# Patient Record
Sex: FEMALE | Race: WHITE
[De-identification: names, ages, dates, MRNs, and addresses within clinical notes are randomized per-mention and may not be internally consistent; named-entity substitution may affect disease eponyms.]

---

## 2017-06-01 NOTE — EDM.PDOC
ED HPI GENERAL MEDICAL PROBLEM





- General


Chief Complaint: Chest Pain


Stated Complaint: chest pain


Time Seen by Provider: 06/01/17 20:47


Source of Information: Reports: Patient, Family (spouse)


History Limitations: Reports: No Limitations





- History of Present Illness


INITIAL COMMENTS - FREE TEXT/NARRATIVE: 





61-year-old female presents to the ED with diffuse central chest heaviness or 

pressure discomfort that radiates through to her back between the scapulae. She 

first appreciated this about 1730 hours today. This persisted. It does not seem 

to be worsened by anything. She doesn't feel like she has any heartburn 

component although the pain seems to radiate from the stomach up into her 

throat. She feels some discomfort in the right upper chest remedied. Nothing 

the left shoulder neck or arm. No shortness of breath. No cough sputum 

production. No fever or chills. A never smoker. No recent medications and not 

on hormone replacement therapy. No recent travel history. Quite active on a 

daily basis. No family history of heart disease. Rare alcohol user. She did 

feel a slight discomfort summoned to the discomfort she has tonight about 2 

nights ago. She is really not aware of any reflux disease.


Onset: Today


Onset Date: 06/01/17


Onset Time: 17:30


Duration: Hour(s):, Constant


Location: Reports: Chest (Radiates through to her in scapular area. Sore of 

some discomfort radiating up into the suprasternal notch area.)


Quality: Reports: Ache, Pressure, Other


Severity: Moderate (Heaviness)


Improves with: Reports: None


Worsens with: Reports: None


Context: Reports: Other (Spent most of the day mowing lawn on a garden tractor.)

.  Denies: Activity, Exercise, Lifting, Sick Contact, Trauma


Associated Symptoms: Reports: Chest Pain.  Denies: Cough, cough w sputum, 

Diaphoresis, Fever/Chills, Loss of Appetite, Malaise, Nausea/Vomiting, Rash, 

Shortness of Breath, Syncope, Weakness


Treatments PTA: Reports: Other (see below) (None.)


  ** Chest


Pain Score (Numeric/FACES): 8





- Related Data


 Allergies











Allergy/AdvReac Type Severity Reaction Status Date / Time


 


ivp dye Allergy  Hives Uncoded 06/01/17 20:37











Home Meds: 


 Home Meds





Calcium Carbonate/Vitamin D3 [Calcium 600 + Vit D 200] 1 tab PO DAILY 06/01/17 [

History]


Cholecalciferol (Vitamin D3) [Vitamin D] 0 unit PO DAILY 06/01/17 [History]


Sertraline [Zoloft] 100 mg PO DAILY 06/01/17 [History]











Past Medical History


Psychiatric History: Reports: Anxiety





- Past Surgical History


Musculoskeletal Surgical History: Reports: Other (See Below) (Traumatic 

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 

implement.)





Social & Family History





- Tobacco Use


Smoking Status *Q: Never Smoker





- Caffeine Use


Caffeine Use: Reports: Coffee, Soda





- Recreational Drug Use


Recreational Drug Use: No





- Living Situation & Occupation


Living situation: Reports: 


Occupation: Employed (Self employed on the farm.)





ED ROS GENERAL





- Review of Systems


Review Of Systems: See Below


Constitutional: Reports: No Symptoms


HEENT: Reports: No Symptoms


Respiratory: Reports: No Symptoms.  Denies: Shortness of Breath, Wheezing, 

Pleuritic Chest Pain, Cough, Sputum


Cardiovascular: Reports: Chest Pain.  Denies: Blood Pressure Problem (History 

of present illness), Claudication, Dyspnea on Exertion, Edema, Lightheadedness, 

Palpitations, PND, Syncope


Endocrine: Reports: No Symptoms


GI/Abdominal: Denies: Abdominal Pain, Anorexia, Black Stool, Bloody Stool, 

Constipation, Diarrhea, Decreased Appetite, Difficulty Swallowing, Distension, 

Flatus


: Reports: No Symptoms


Musculoskeletal: Reports: Other (Has some discomfort in her right upper chest 

remedy which he believes is from raking too much over the last few days)


Skin: Reports: No Symptoms


Neurological: Reports: No Symptoms


Psychiatric: Reports: Anxiety (Take sertraline 100 mg daily for anxiety relief.)


Hematologic/Lymphatic: Reports: No Symptoms


Immunologic: Reports: No Symptoms





ED EXAM, GENERAL





- Physical Exam


Exam: See Below


Exam Limited By: No Limitations


General Appearance: Alert, WD/WN, Anxious (Mildly anxious.), Mild Distress


Eye Exam: Bilateral Eye: Normal Inspection


Throat/Mouth: Normal Inspection, Normal Lips, Normal Teeth, Normal Oropharynx


Head: Atraumatic, Normocephalic


Neck: Normal Inspection, Supple, Non-Tender, Full Range of Motion.  No: 

Lymphadenopathy (L), Lymphadenopathy (R)


Respiratory/Chest: No Respiratory Distress, Lungs Clear, Normal Breath Sounds, 

No Accessory Muscle Use, Chest Non-Tender


Cardiovascular: Normal Peripheral Pulses, Regular Rate, Rhythm, No Edema, No 

Gallop, No Murmur


Peripheral Pulses: 3+: Posterior Tibial (L), Posterior Tibial (R), Dorsalis 

Pedis (L), Dorsalis Pedis (R)


GI/Abdominal: Normal Bowel Sounds, Soft, Non-Tender, No Organomegaly, No 

Distention, No Abnormal Bruit, No Mass, Other (No surgical scars)


Back Exam: Normal Inspection, Full Range of Motion.  No: CVA Tenderness (L), 

CVA Tenderness (R)


Extremities: Normal Inspection, Normal Range of Motion, Non-Tender, No Pedal 

Edema, Normal Capillary Refill


Neurological: Alert, Oriented, CN II-XII Intact, Normal Cognition, No Motor/

Sensory Deficits


Psychiatric: Normal Affect, Normal Mood


Skin Exam: Warm, Dry, Intact, Normal Color, No Rash





EKG INTERPRETATION


EKG Date: 06/01/17


Time: 20:35


Rhythm: NSR


Rate (beats/min): 70


Axis: LAD-left axis deviation (-42.)


P-wave: present


QRS: normal


ST-T: depressed (Very minimal depression of the ST segment is appreciated in 

leads 23 and aVF this was less than 0.5 mm. Cannot rule out inferior wall 

ischemia. There is also mild ST segment depression in lead V5 and perhaps V4.)


QT: normal





Course





- Vital Signs


Last Recorded V/S: 


 Last Vital Signs











Temp  36.6 C   06/01/17 20:35


 


Pulse  61   06/01/17 22:11


 


Resp  18   06/01/17 22:11


 


BP  113/78   06/01/17 22:11


 


Pulse Ox  98   06/01/17 22:11














- Orders/Labs/Meds


Orders: 


 Active Orders 24 hr











 Category Date Time Status


 


 EKG 12 Lead [EKG Documentation Completion] [RC] STAT Care  06/01/17 20:35 

Active


 


 EKG Documentation Completion [RC] STAT Care  06/01/17 20:49 Inactive


 


 EKG Documentation Completion [RC] STAT Care  06/01/17 22:09 Active


 


 Chest 1V Frontal [CR] Stat Exams  06/01/17 20:49 Taken


 


 CBC W/O DIFF,HEMOGRAM [HEME] MOTH@0700 Lab  06/05/17 07:00 Ordered


 


 CBC W/O DIFF,HEMOGRAM [HEME] MOTH@0700 Lab  06/08/17 07:00 Ordered


 


 CBC W/O DIFF,HEMOGRAM [HEME] MOTH@0700 Lab  06/12/17 07:00 Ordered


 


 CBC W/O DIFF,HEMOGRAM [HEME] MOTH@0700 Lab  06/15/17 07:00 Ordered


 


 CBC W/O DIFF,HEMOGRAM [HEME] MOTH@0700 Lab  06/19/17 07:00 Ordered


 


 CBC W/O DIFF,HEMOGRAM [HEME] MOTH@0700 Lab  06/22/17 07:00 Ordered


 


 Nitroglycerin/D5W [Nitroglycerin  25 MG/D5W 250 ML] Med  06/01/17 21:00 Active





 25 mg in 250 ml IV ASDIRECTED   


 


 Sodium Chloride 0.9% [Normal Saline] 1,000 ml Med  06/01/17 21:00 Active





 IV ASDIRECTED   








 Medication Orders





Nitroglycerin/Dextrose (Nitroglycerin  25 Mg/D5w 250 Ml)  25 mg in 250 mls @ 6 

mls/hr IV ASDIRECTED MAKAYLA


   PRN Reason: 10 MCG/MIN


   Last Admin: 06/01/17 22:09  Dose: 10 mcg/min, 6 mls/hr


   Admin: 06/01/17 21:36  Dose: 10 mcg/min, 6 mls/hr


Sodium Chloride (Normal Saline)  1,000 mls @ 125 mls/hr IV ASDIRECTED MAKAYLA


   Last Admin: 06/01/17 20:57  Dose: 125 mls/hr








Labs: 


 Laboratory Tests











  06/01/17 06/01/17 06/01/17 Range/Units





  20:55 20:55 20:55 


 


WBC  4.91    (3.98-10.04)  K/mm3


 


RBC  4.43    (3.98-5.22)  M/mm3


 


Hgb  13.4    (11.2-15.7)  gm/L


 


Hct  41.3    (34.1-44.9)  %


 


MCV  93.2    (79.4-94.8)  fl


 


MCH  30.2    (25.6-32.2)  pg


 


MCHC  32.4    (32.2-35.5)  g/dl


 


RDW Std Deviation  44.9    (36.4-46.3)  fL


 


Plt Count  175 L    (182-369)  K/mm3


 


MPV  8.7 L    (9.4-12.3)  fl


 


Neutrophils % (Manual)  48    (40-60)  %


 


Band Neutrophils %  0    (0-10)  %


 


Lymphocytes % (Manual)  43 H    (20-40)  %


 


Atypical Lymphs %  0    %


 


Monocytes % (Manual)  6    (2-10)  %


 


Eosinophils % (Manual)  2    (0.7-5.8)  %


 


Basophils % (Manual)  1    (0.1-1.2)  


 


Platelet Estimate  Adequate    


 


Plt Morphology Comment  Normal    


 


RBC Morph Comment  Normal    


 


PT   10.7   (8.0-13.0)  SECONDS


 


INR   0.98   


 


D-Dimer, Quantitative   0.36   (0.19-0.59)  mg/L


 


Sodium    143  (136-145)  mEq/L


 


Potassium    3.9  (3.5-5.1)  mEq/L


 


Chloride    106  ()  mEq/L


 


Carbon Dioxide    28  (21-32)  mEq/L


 


Anion Gap    12.9  (5-15)  


 


BUN    21 H  (7-18)  mg/dL


 


Creatinine    0.9  (0.55-1.02)  mg/dL


 


Est Cr Clr Drug Dosing    52.57  mL/min


 


Estimated GFR (MDRD)    > 60  (>60)  mL/min


 


BUN/Creatinine Ratio    23.3 H  (14-18)  


 


Glucose    100  ()  mg/dL


 


Calcium    9.9  (8.5-10.1)  mg/dL


 


Total Bilirubin    0.2  (0.2-1.0)  mg/dL


 


AST    17  (15-37)  U/L


 


ALT    24  (14-59)  U/L


 


Alkaline Phosphatase    58  ()  U/L


 


CK-MB (CK-2)    1.5  (0-3.6)  ng/ml


 


Troponin I    0.077 H*  (0.00-0.056)  ng/mL


 


C-Reactive Protein    < 0.2  (<1.0)  mg/dL


 


Total Protein    7.4  (6.4-8.2)  g/dl


 


Albumin    4.0  (3.4-5.0)  g/dl


 


Globulin    3.4  gm/dL


 


Albumin/Globulin Ratio    1.2  (1-2)  














  06/01/17 Range/Units





  22:45 


 


WBC   (3.98-10.04)  K/mm3


 


RBC   (3.98-5.22)  M/mm3


 


Hgb   (11.2-15.7)  gm/L


 


Hct   (34.1-44.9)  %


 


MCV   (79.4-94.8)  fl


 


MCH   (25.6-32.2)  pg


 


MCHC   (32.2-35.5)  g/dl


 


RDW Std Deviation   (36.4-46.3)  fL


 


Plt Count   (182-369)  K/mm3


 


MPV   (9.4-12.3)  fl


 


Neutrophils % (Manual)   (40-60)  %


 


Band Neutrophils %   (0-10)  %


 


Lymphocytes % (Manual)   (20-40)  %


 


Atypical Lymphs %   %


 


Monocytes % (Manual)   (2-10)  %


 


Eosinophils % (Manual)   (0.7-5.8)  %


 


Basophils % (Manual)   (0.1-1.2)  


 


Platelet Estimate   


 


Plt Morphology Comment   


 


RBC Morph Comment   


 


PT   (8.0-13.0)  SECONDS


 


INR   


 


D-Dimer, Quantitative   (0.19-0.59)  mg/L


 


Sodium   (136-145)  mEq/L


 


Potassium   (3.5-5.1)  mEq/L


 


Chloride   ()  mEq/L


 


Carbon Dioxide   (21-32)  mEq/L


 


Anion Gap   (5-15)  


 


BUN   (7-18)  mg/dL


 


Creatinine   (0.55-1.02)  mg/dL


 


Est Cr Clr Drug Dosing   mL/min


 


Estimated GFR (MDRD)   (>60)  mL/min


 


BUN/Creatinine Ratio   (14-18)  


 


Glucose   ()  mg/dL


 


Calcium   (8.5-10.1)  mg/dL


 


Total Bilirubin   (0.2-1.0)  mg/dL


 


AST   (15-37)  U/L


 


ALT   (14-59)  U/L


 


Alkaline Phosphatase   ()  U/L


 


CK-MB (CK-2)  7.1 H  (0-3.6)  ng/ml


 


Troponin I  1.700 H*  (0.00-0.056)  ng/mL


 


C-Reactive Protein   (<1.0)  mg/dL


 


Total Protein   (6.4-8.2)  g/dl


 


Albumin   (3.4-5.0)  g/dl


 


Globulin   gm/dL


 


Albumin/Globulin Ratio   (1-2)  











Meds: 


Medications











Generic Name Dose Route Start Last Admin





  Trade Name Freq  PRN Reason Stop Dose Admin


 


Nitroglycerin/Dextrose  25 mg in 250 mls @ 6 mls/hr  06/01/17 21:00  06/01/17 22

:09





  Nitroglycerin  25 Mg/D5w 250 Ml  IV   10 mcg/min





  ASDIRECTED MAKAYLA   6 mls/hr





  10 MCG/MIN   Administration


 


Sodium Chloride  1,000 mls @ 125 mls/hr  06/01/17 21:00  06/01/17 20:57





  Normal Saline  IV   125 mls/hr





  ASDIRECTED MAKAYLA   Administration














Discontinued Medications














Generic Name Dose Route Start Last Admin





  Trade Name Gloria  PRN Reason Stop Dose Admin


 


Aspirin  324 mg  06/01/17 20:48  06/01/17 20:57





  Aspirin  PO  06/01/17 20:49  324 mg





  ONETIME ONE   Administration


 


Enoxaparin Sodium  60 mg  06/01/17 22:14  06/01/17 22:31





  Lovenox  SUBCUT  06/01/17 22:15  60 mg





  ONETIME ONE   Administration


 


Hyoscyamine  0.125 mg  06/01/17 20:48  06/01/17 20:57





  Hyomax-Sl  SL  06/01/17 20:49  0.125 mg





  ONETIME ONE   Administration














- Radiology Interpretation


Free Text/Narrative:: 





61-year-old female brought to the ED by family member members due to her 

complaints of persistent central chest heaviness rating 2 to her intrascapular 

area bilaterally. Started about 1730 hours tonight and is never gone away. No 

heartburn or indigestion. He is aware of discomfort radiating up into the 

sternal notch. Has some discomfort in her right arm which he believes is from 

excessive use in the last few days. No history of coronary disease. She is a 

never smoker. No not on hormone therapy. No family history of heart disease. 

ECG reveals slight ST segment depression in length less than 0.5 mm in leads 23 

and aVF. He may or may not represent ischemic changes. There is ST segment 

depression in V5 and V6 but only in one beat. These are not  significant. Plan 

to be treated as an MI until proven otherwise. I drip to be started at 10 mcg 

per minute. Given  mg chewed. We'll give her Levsin 0.125 mg sublingual 

to see relaxes and esophageal spasm. She may have occult GERD. Chest x-ray 

routine labs to include cardiac markers etc. IV will be normal saline at 125 

mils per hour.





- Re-Assessments/Exams


Free Text/Narrative Re-Assessment/Exam: 





06/01/17 21:38 problems encountered with the computer orders. [employer] went 

down for a period of time and the nitroglycerin drip that I had ordered liver 

trans-for tomorrow. It was there was never started. When questioned at 0910 

hours the patient states that the Levsin sublingually seemed to help 

significantly relieve her discomfort in her central chest. Therefore nitro drip 

will be withheld at this time. Hematology his back at this time showing a white 

count of 4.91 with hemoglobin of 13.4 hematocrit of 41.3. Platelets are normal 

175,000. Coags are normal. D-dimer 0.36. Awaiting the chemistry and the 

differential. Chest x-ray done portably reveals increased vascular congestion 

to the right lower lobes bilaterally a normal cardiac silhouette. The lungs are 

mildly hyperinflated.





06/01/17 22:03 Chemistry is now back showing a differential of 40% neutrophils 

and no bands. Initial sodium of 143 potassium 3.9 anion gap is 12.9. Glucose 

100 troponin is elevated at 0.077. CK-MB fraction normal at 1.5. Therefore she 

needs to be kept in the ED for cardiac rule out. Nitro drip will be started at 

this time and I will give her Lovenox subcutaneous 1 mg per kilogram. ECG will 

be repeated. I will repeat her troponin and CK-MB fraction at 10:30 which is 2 

hours from the time it was done first. Interestingly the patient reports that 

her pain remains pretty well gone.





06/01/17 22:24: Second ECG is revealing sinus rhythm at 59 per minute. The very 

subtle ST segment depression previously noted in leads 23 and aVF and V5 V6 his 

return to baseline. She still has a left axis deviation of -42 but there are 

no signs of ischemia at this time. Questionable laboratory mistake. Will await 

the repeat troponin CK-MB fractions.





06/01/17 23:30 second set of cardiac markers came back elevated with a troponin 

of 1.70 and a CK-MB is now 7.5. She remains completely pain-free. She'll 

therefore be sent to [facility] at her request for cardiology 

consultation and likely angiogram in the morning as long as she remains chest 

pain-free. We'll discuss with on-call personnel at [facility] 

and she will be transported by ground ambulance.





06/01/17 23:38: Spoke with on call hospitalist Dr. Kaiser and she has accepted 

care of this patient. Tentatively she will be admitted to the telemetry unit. 

If she develops chest pain en route they her to stop in the emergency room.








Departure





- Departure


Time of Disposition: 23:41


Disposition: DC/Tfer to Acute Hospital [facility]


Reason for Transfer *Q: Other


Condition: fair


Clinical Impression: 


 Non-STEMI (non-ST elevated myocardial infarction)





Forms:  ED Department Discharge


Additional Instructions: 


Transferred to [facility] in [place] per ground ambulance for direct 

addition to the hospital under the care of Dr. Kaiser. This is for cardiology 

consultation in regards to elevated cardiac enzymes with earlier chest pain.





- My Orders


Last 24 Hours: 


My Active Orders





06/01/17 20:35


EKG 12 Lead [EKG Documentation Completion] [RC] STAT 





06/01/17 20:49


EKG Documentation Completion [RC] STAT 


Chest 1V Frontal [CR] Stat 





06/01/17 21:00


Nitroglycerin/D5W [Nitroglycerin  25 MG/D5W 250 ML] 25 mg in 250 ml IV 

ASDIRECTED 


Sodium Chloride 0.9% [Normal Saline] 1,000 ml IV ASDIRECTED 





06/01/17 22:09


EKG Documentation Completion [RC] STAT 





06/05/17 07:00


CBC W/O DIFF,HEMOGRAM [HEME] MOTH@0700 





06/08/17 07:00


CBC W/O DIFF,HEMOGRAM [HEME] MOTH@0700 





06/12/17 07:00


CBC W/O DIFF,HEMOGRAM [HEME] MOTH@0700 





06/15/17 07:00


CBC W/O DIFF,HEMOGRAM [HEME] MOTH@0700 





06/19/17 07:00


CBC W/O DIFF,HEMOGRAM [HEME] MOTH@0700 





06/22/17 07:00


CBC W/O DIFF,HEMOGRAM [HEME] MOTH@0700 














- Assessment/Plan


Last 24 Hours: 


My Active Orders





06/01/17 20:35


EKG 12 Lead [EKG Documentation Completion] [RC] STAT 





06/01/17 20:49


EKG Documentation Completion [RC] STAT 


Chest 1V Frontal [CR] Stat 





06/01/17 21:00


Nitroglycerin/D5W [Nitroglycerin  25 MG/D5W 250 ML] 25 mg in 250 ml IV 

ASDIRECTED 


Sodium Chloride 0.9% [Normal Saline] 1,000 ml IV ASDIRECTED 





06/01/17 22:09


EKG Documentation Completion [RC] STAT 





06/05/17 07:00


CBC W/O DIFF,HEMOGRAM [HEME] MOTH@0700 06/08/17 07:00


CBC W/O DIFF,HEMOGRAM [HEME] MOTH@0700 06/12/17 07:00


CBC W/O DIFF,HEMOGRAM [HEME] MOTH@0700 





06/15/17 07:00


CBC W/O DIFF,HEMOGRAM [HEME] MOTH@0700 06/19/17 07:00


CBC W/O DIFF,HEMOGRAM [HEME] MOTH@0700 06/22/17 07:00


CBC W/O DIFF,HEMOGRAM [HEME] MOTH@0700

## 2017-06-02 NOTE — CR
Chest: Portable view of the chest was obtained.

 

Comparison: No previous study.

 

Heart size and mediastinum are normal.  Lungs are clear.  Bony 

structures are grossly intact.

 

Impression:

1.  Nothing acute is identified on portable chest x-ray.

 

Diagnostic code #1

## 2020-08-24 ENCOUNTER — HOSPITAL ENCOUNTER (EMERGENCY)
Dept: HOSPITAL 41 - JD.ED | Age: 65
Discharge: HOME | End: 2020-08-24
Payer: COMMERCIAL

## 2020-08-24 VITALS — DIASTOLIC BLOOD PRESSURE: 81 MMHG | SYSTOLIC BLOOD PRESSURE: 143 MMHG | HEART RATE: 70 BPM

## 2020-08-24 DIAGNOSIS — E53.8: ICD-10-CM

## 2020-08-24 DIAGNOSIS — R07.89: Primary | ICD-10-CM

## 2020-08-24 DIAGNOSIS — F41.9: ICD-10-CM

## 2020-08-24 DIAGNOSIS — R53.1: ICD-10-CM

## 2020-08-24 DIAGNOSIS — Z79.82: ICD-10-CM

## 2020-08-24 DIAGNOSIS — Z91.041: ICD-10-CM

## 2020-08-24 DIAGNOSIS — Z79.899: ICD-10-CM

## 2020-08-24 DIAGNOSIS — I25.2: ICD-10-CM

## 2020-08-24 DIAGNOSIS — E78.00: ICD-10-CM

## 2020-08-24 DIAGNOSIS — J45.909: ICD-10-CM

## 2020-08-24 LAB — HBA1C BLD-MCNC: 5.9 % (ref 4.5–6.2)

## 2020-08-24 PROCEDURE — 85025 COMPLETE CBC W/AUTO DIFF WBC: CPT

## 2020-08-24 PROCEDURE — 82553 CREATINE MB FRACTION: CPT

## 2020-08-24 PROCEDURE — 83036 HEMOGLOBIN GLYCOSYLATED A1C: CPT

## 2020-08-24 PROCEDURE — 99285 EMERGENCY DEPT VISIT HI MDM: CPT

## 2020-08-24 PROCEDURE — 85610 PROTHROMBIN TIME: CPT

## 2020-08-24 PROCEDURE — 71045 X-RAY EXAM CHEST 1 VIEW: CPT

## 2020-08-24 PROCEDURE — 80053 COMPREHEN METABOLIC PANEL: CPT

## 2020-08-24 PROCEDURE — 96360 HYDRATION IV INFUSION INIT: CPT

## 2020-08-24 PROCEDURE — 84443 ASSAY THYROID STIM HORMONE: CPT

## 2020-08-24 PROCEDURE — 84484 ASSAY OF TROPONIN QUANT: CPT

## 2020-08-24 PROCEDURE — 86140 C-REACTIVE PROTEIN: CPT

## 2020-08-24 PROCEDURE — 83735 ASSAY OF MAGNESIUM: CPT

## 2020-08-24 PROCEDURE — 85652 RBC SED RATE AUTOMATED: CPT

## 2020-08-24 PROCEDURE — 83880 ASSAY OF NATRIURETIC PEPTIDE: CPT

## 2020-08-24 PROCEDURE — 85730 THROMBOPLASTIN TIME PARTIAL: CPT

## 2020-08-24 PROCEDURE — 96361 HYDRATE IV INFUSION ADD-ON: CPT

## 2020-08-24 PROCEDURE — 36415 COLL VENOUS BLD VENIPUNCTURE: CPT

## 2020-08-24 PROCEDURE — 82607 VITAMIN B-12: CPT

## 2020-08-24 NOTE — CR
Chest: Portable view of the chest was obtained.

 

Comparison: Prior chest x-ray of 06/01/17.

 

Heart size is normal.  Tortuous thoracic aorta is seen.  Lungs are 

clear with no acute parenchymal change.  Bony structures are grossly 

intact.

 

Impression:

1.  Nothing acute is seen on portable chest x-ray.

 

Diagnostic code #1

 

This report was dictated in MDT

## 2020-08-24 NOTE — EDM.PDOC
ED HPI GENERAL MEDICAL PROBLEM





- General


Chief Complaint: Cardiovascular Problem


Stated Complaint: CHEST PAIN AND SOB


Time Seen by Provider: 08/24/20 12:56


Source of Information: Reports: Patient


History Limitations: Reports: No Limitations





- History of Present Illness


INITIAL COMMENTS - FREE TEXT/NARRATIVE: 


65-year-old female presents to the ED just feeling unwell.  She states she is a 

little lightheaded she is a little dizzy little shortness of breath little week.

 Nothing that she could put her finger on per se.  She has a history apparently 

of Prinzmetal's angina.  She takes Imdur long-acting and did use 2 nitroglycerin

tablets this morning with no relief of her symptoms.  She was not having any 

chest pain at the time.  There is been no recent changes to any of her m

edications.  She denies cough or sputum production but feels short of breath on 

exertion.  She does not think she is ever had any fluid buildup in her lungs.  

Bowel function is normal.  She has not had any fever or chills.  Denies any 

dysuria urgency or frequency.





Onset: Gradual, Other (Not been feeling well off and on for the last for 5 days.

 Seems to come in spells.  She not aware of any irregular heartbeat or skips.)


Onset Date: 08/20/20


Duration: Day(s):, Intermittent


Location: Reports: Generalized (Neurolyse sense of feeling weak and unwell.  

Lightheaded and dizzy.  Mildly short of breath.)


Quality: Reports: Other


Severity: Moderate


Improves with: Reports: None


Worsens with: Reports: None, Other (Is perhaps a little worse with trying to 

take a walk on Friday night she turned around went back home due to feeling 

lightheaded and dizzy but no chest pain.)


Context: Denies: Activity, Exercise, Lifting, Sick Contact, Trauma, Other


Associated Symptoms: Reports: Loss of Appetite, Malaise, Shortness of Breath, 

Weakness.  Denies: Confusion, Chest Pain, Cough, cough w sputum, Diaphoresis, 

Fever/Chills, Headaches, Nausea/Vomiting, Rash, Seizure, Syncope


Treatments PTA: Reports: Other (see below) (Only prescribed medications but did 

take 2 nitroglycerin this morning but was not having any chest pain at the lisha

e.)





- Related Data


                                    Allergies











Allergy/AdvReac Type Severity Reaction Status Date / Time


 


Iodinated Contrast Media Allergy  Hives Verified 08/24/20 12:50





[Iodinated Contrast- Oral     





and IV Dye]     











Home Meds: 


                                    Home Meds





Sertraline [Zoloft] 100 mg PO DAILY 06/01/17 [History]


Albuterol [Ventolin HFA] 2 puff INH Q4H PRN 10/23/18 [History]


Aspirin [Halfprin] 81 mg PO DAILY 10/23/18 [History]


Calcium/Vits D3/C/K2/Minerals [Bone Essentials Capsule] 1 tab PO DAILY 10/23/18 

[History]


Cholecalciferol (Vitamin D3) [Vitamin D3] 2,000 unit PO BID 10/23/18 [History]


Diclofenac Sodium [Voltaren] 1 dose TOP BID PRN 10/23/18 [History]


Isosorbide Mononitrate [Imdur] 30 mg PO DAILY 10/23/18 [History]


Loratadine [Claritin] 10 mg PO DAILY 10/23/18 [History]


Multivitamin [Daily Leo] 2 tab PO DAILY 10/23/18 [History]


Nitroglycerin [Nitrostat] 0.4 mg PO Q5M PRN 10/23/18 [History]


Pravastatin Sodium 10 mg PO DAILY 10/23/18 [History]


dilTIAZem HCL [Cardizem] 120 mg PO DAILY 10/23/18 [History]


Acetaminophen/HYDROcodone [Norco 325-5 MG] 1 - 2 tab PO Q6H PRN #10 tablet 

10/24/18 [Rx]











Past Medical History


HEENT History: Reports: Impaired Vision, Other (See Below)


Other HEENT History: wears glasses


Cardiovascular History: Reports: Angina, High Cholesterol, MI, Other (See Below)


Other Cardiovascular History: heart disease, STEMI, SVT, coronary vasospasm


Respiratory History: Reports: Asthma, Other (See Below)


Other Respiratory History: abnormal PFTs


Gastrointestinal History: Reports: Hemorrhoids


Genitourinary History: Reports: None


OB/GYN History: Reports: Other (See Below)


Other OB/GYN History: fibrocystic breast changes


Musculoskeletal History: Reports: Other (See Below)


Other Musculoskeletal History: carpal tunnel syndrome, baker's cyst, 

patellofemoral syndrome


Neurological History: Reports: None


Psychiatric History: Reports: Anxiety


Endocrine/Metabolic History: Reports: None


Hematologic History: Reports: None


Immunologic History: Reports: None


Oncologic (Cancer) History: Reports: None


Dermatologic History: Reports: None





- Past Surgical History


Cardiovascular Surgical History: Reports: None


Respiratory Surgical History: Reports: None


GI Surgical History: Reports: Colonoscopy


Female  Surgical History: Reports: Breast Biopsy


Endocrine Surgical History: Reports: None


Neurological Surgical History: Reports: None


Musculoskeletal Surgical History: Reports: Other (See Below)


Other Musculoskeletal Surgeries/Procedures:: bunionectomy, left great toes 

chelectomy, hand surgery


Oncologic Surgical History: Reports: None


Dermatological Surgical History: Reports: None





Social & Family History





- Tobacco Use


Smoking Status *Q: Never Smoker


Second Hand Smoke Exposure: No





- Caffeine Use


Caffeine Use: Reports: Coffee





- Recreational Drug Use


Recreational Drug Use: No





- Living Situation & Occupation


Living situation: Reports: 


Occupation: Employed (Self employed on the farm.)





ED ROS GENERAL





- Review of Systems


Review Of Systems: See Below


Constitutional: Reports: Malaise, Weakness, Fatigue, Decreased Appetite.  

Denies: Fever, Chills


HEENT: Reports: Glasses


Respiratory: Reports: Shortness of Breath, Other (She has a history of asthma 

but has not had to use her inhaler anymore than usual.).  Denies: Wheezing, 

Pleuritic Chest Pain, Cough, Sputum, Hemoptysis


Cardiovascular: Reports: Blood Pressure Problem, Dyspnea on Exertion, 

Lightheadedness.  Denies: Chest Pain, Claudication, Edema, Orthopnea (Does have 

mild hypertension.), Palpitations, PND, Syncope, Other


Endocrine: Reports: Fatigue


GI/Abdominal: Reports: Decreased Appetite.  Denies: Distension, Flatus, 

Hematemesis, Hematochezia, Melena


: Reports: Other


Musculoskeletal: Reports: Joint Pain


Skin: Reports: No Symptoms


Neurological: Reports: Dizziness, Headache, Weakness.  Denies: Numbness, 

Syncope, Tingling


Psychiatric: Reports: No Symptoms


Hematologic/Lymphatic: Reports: No Symptoms


Immunologic: Reports: No Symptoms





ED EXAM, GENERAL





- Physical Exam


Exam: See Below


Exam Limited By: No Limitations


General Appearance: Alert, WD/WN, Anxious, Mild Distress, Other


Eye Exam: Bilateral Eye: Normal Inspection, PERRL


Throat/Mouth: Normal Inspection, Normal Lips, Normal Teeth, Normal Oropharynx


Head: Atraumatic, Normocephalic


Neck: Normal Inspection, Supple, Non-Tender, Full Range of Motion.  No: Carotid 

Bruit, Lymphadenopathy (L), Lymphadenopathy (R), Thyromegaly


Respiratory/Chest: No Respiratory Distress, Lungs Clear, Normal Breath Sounds, 

No Accessory Muscle Use.  No: Rales, Wheezing


Cardiovascular: Normal Peripheral Pulses, Regular Rate, Rhythm, No Edema, No 

Gallop, No Murmur, No Rub


Peripheral Pulses: 3+: Carotid (L), Carotid (R), Posterior Tibial (L), Posterior

 Tibial (R), Dorsalis Pedis (L), Dorsalis Pedis (R)


GI/Abdominal: Normal Bowel Sounds, Soft, Non-Tender, No Organomegaly, No 

Abnormal Bruit, No Mass, Pelvis Stable, Other


Back Exam: Normal Inspection, Full Range of Motion, Other.  No: CVA Tenderness 

(L), CVA Tenderness (R)


Extremities: Normal Inspection, Normal Range of Motion, Non-Tender, No Pedal 

Edema


Neurological: Alert, Oriented, CN II-XII Intact, Normal Cognition, No 

Motor/Sensory Deficits


Psychiatric: Anxious


Skin Exam: Warm, Dry, Intact, Normal Color, No Rash





EKG INTERPRETATION


EKG Date: 08/24/20


Time: 12:44


Rhythm: NSR


Rate (Beats/Min): 69


Axis: LAD-Left Axis Deviation (-50 degrees.)


P-Wave: Present


QRS: Other (Left anterior fascicular block pattern.  There are Q waves leads II,

 III and aVF.  Consider old inferior wall myocardial infarction.)


ST-T: Other (T wave flattening in aVF nonspecific finding)


QT: Normal


EKG Interpretation Comments: 





Abnormal ECG





Course





- Vital Signs


Last Recorded V/S: 


                                Last Vital Signs











Temp  36.2 C   08/24/20 12:47


 


Pulse  70   08/24/20 12:47


 


Resp  16   08/24/20 12:47


 


BP  143/81 H  08/24/20 12:47


 


Pulse Ox  95   08/24/20 12:47














- Orders/Labs/Meds


Labs: 


                                Laboratory Tests











  08/24/20 08/24/20 08/24/20 Range/Units





  12:47 12:47 12:47 


 


WBC  5.26    (3.98-10.04)  K/mm3


 


RBC  4.23    (3.98-5.22)  M/mm3


 


Hgb  13.4    (11.2-15.7)  gm/dl


 


Hct  41.9    (34.1-44.9)  %


 


MCV  99.1 H    (79.4-94.8)  fl


 


MCH  31.7    (25.6-32.2)  pg


 


MCHC  32.0 L    (32.2-35.5)  g/dl


 


RDW Std Deviation  48.0 H    (36.4-46.3)  fL


 


Plt Count  205    (182-369)  K/mm3


 


MPV  9.3 L    (9.4-12.3)  fl


 


Neut % (Auto)  65.5    (34.0-71.1)  %


 


Lymph % (Auto)  24.5    (19.3-51.7)  %


 


Mono % (Auto)  6.7    (4.7-12.5)  %


 


Eos % (Auto)  2.5    (0.7-5.8)  


 


Baso % (Auto)  0.6    (0.1-1.2)  %


 


Neut # (Auto)  3.45    (1.56-6.13)  K/mm3


 


Lymph # (Auto)  1.29    (1.18-3.74)  K/mm3


 


Mono # (Auto)  0.35    (0.24-0.36)  K/mm3


 


Eos # (Auto)  0.13    (0.04-0.36)  K/mm3


 


Baso # (Auto)  0.03    (0.01-0.08)  K/mm3


 


ESR     (0-20)  mm/hr


 


PT   11.0   (9.7-12.0)  SECONDS


 


INR   1.03   


 


APTT   25   (22-31)  SECONDS


 


Sodium    141  (136-145)  mEq/L


 


Potassium    3.6  (3.5-5.1)  mEq/L


 


Chloride    104  ()  mEq/L


 


Carbon Dioxide    28  (21-32)  mEq/L


 


Anion Gap    12.6  (5-15)  


 


BUN    14  (7-18)  mg/dL


 


Creatinine    0.8  (0.55-1.02)  mg/dL


 


Est Cr Clr Drug Dosing    56.19  mL/min


 


Estimated GFR (MDRD)    > 60  (>60)  mL/min


 


BUN/Creatinine Ratio    17.5  (14-18)  


 


Glucose    155 H  ()  mg/dL


 


Hemoglobin A1c     (4.50-6.20)  %


 


Calcium    9.1  (8.5-10.1)  mg/dL


 


Magnesium    1.8  (1.8-2.4)  mg/dl


 


Total Bilirubin    0.3  (0.2-1.0)  mg/dL


 


AST    19  (15-37)  U/L


 


ALT    23  (14-59)  U/L


 


Alkaline Phosphatase    62  ()  U/L


 


CK-MB (CK-2)    0.8  (0-3.6)  ng/ml


 


Troponin I    < 0.017  (0.00-0.056)  ng/mL


 


C-Reactive Protein    0.4  (<1.0)  mg/dL


 


NT-Pro-B Natriuret Pep     (0-125)  pg/mL


 


Total Protein    7.3  (6.4-8.2)  g/dl


 


Albumin    3.9  (3.4-5.0)  g/dl


 


Globulin    3.4  gm/dL


 


Albumin/Globulin Ratio    1.2  (1-2)  


 


Vitamin B12     (193-986)  pg/ml


 


TSH 3rd Generation     (0.358-3.74)  uIU/mL














  08/24/20 08/24/20 08/24/20 Range/Units





  12:47 12:47 12:47 


 


WBC     (3.98-10.04)  K/mm3


 


RBC     (3.98-5.22)  M/mm3


 


Hgb     (11.2-15.7)  gm/dl


 


Hct     (34.1-44.9)  %


 


MCV     (79.4-94.8)  fl


 


MCH     (25.6-32.2)  pg


 


MCHC     (32.2-35.5)  g/dl


 


RDW Std Deviation     (36.4-46.3)  fL


 


Plt Count     (182-369)  K/mm3


 


MPV     (9.4-12.3)  fl


 


Neut % (Auto)     (34.0-71.1)  %


 


Lymph % (Auto)     (19.3-51.7)  %


 


Mono % (Auto)     (4.7-12.5)  %


 


Eos % (Auto)     (0.7-5.8)  


 


Baso % (Auto)     (0.1-1.2)  %


 


Neut # (Auto)     (1.56-6.13)  K/mm3


 


Lymph # (Auto)     (1.18-3.74)  K/mm3


 


Mono # (Auto)     (0.24-0.36)  K/mm3


 


Eos # (Auto)     (0.04-0.36)  K/mm3


 


Baso # (Auto)     (0.01-0.08)  K/mm3


 


ESR  7    (0-20)  mm/hr


 


PT     (9.7-12.0)  SECONDS


 


INR     


 


APTT     (22-31)  SECONDS


 


Sodium     (136-145)  mEq/L


 


Potassium     (3.5-5.1)  mEq/L


 


Chloride     ()  mEq/L


 


Carbon Dioxide     (21-32)  mEq/L


 


Anion Gap     (5-15)  


 


BUN     (7-18)  mg/dL


 


Creatinine     (0.55-1.02)  mg/dL


 


Est Cr Clr Drug Dosing     mL/min


 


Estimated GFR (MDRD)     (>60)  mL/min


 


BUN/Creatinine Ratio     (14-18)  


 


Glucose     ()  mg/dL


 


Hemoglobin A1c     (4.50-6.20)  %


 


Calcium     (8.5-10.1)  mg/dL


 


Magnesium     (1.8-2.4)  mg/dl


 


Total Bilirubin     (0.2-1.0)  mg/dL


 


AST     (15-37)  U/L


 


ALT     (14-59)  U/L


 


Alkaline Phosphatase     ()  U/L


 


CK-MB (CK-2)     (0-3.6)  ng/ml


 


Troponin I     (0.00-0.056)  ng/mL


 


C-Reactive Protein     (<1.0)  mg/dL


 


NT-Pro-B Natriuret Pep   172 H   (0-125)  pg/mL


 


Total Protein     (6.4-8.2)  g/dl


 


Albumin     (3.4-5.0)  g/dl


 


Globulin     gm/dL


 


Albumin/Globulin Ratio     (1-2)  


 


Vitamin B12     (193-986)  pg/ml


 


TSH 3rd Generation    1.005  (0.358-3.74)  uIU/mL














  08/24/20 08/24/20 Range/Units





  12:47 12:47 


 


WBC    (3.98-10.04)  K/mm3


 


RBC    (3.98-5.22)  M/mm3


 


Hgb    (11.2-15.7)  gm/dl


 


Hct    (34.1-44.9)  %


 


MCV    (79.4-94.8)  fl


 


MCH    (25.6-32.2)  pg


 


MCHC    (32.2-35.5)  g/dl


 


RDW Std Deviation    (36.4-46.3)  fL


 


Plt Count    (182-369)  K/mm3


 


MPV    (9.4-12.3)  fl


 


Neut % (Auto)    (34.0-71.1)  %


 


Lymph % (Auto)    (19.3-51.7)  %


 


Mono % (Auto)    (4.7-12.5)  %


 


Eos % (Auto)    (0.7-5.8)  


 


Baso % (Auto)    (0.1-1.2)  %


 


Neut # (Auto)    (1.56-6.13)  K/mm3


 


Lymph # (Auto)    (1.18-3.74)  K/mm3


 


Mono # (Auto)    (0.24-0.36)  K/mm3


 


Eos # (Auto)    (0.04-0.36)  K/mm3


 


Baso # (Auto)    (0.01-0.08)  K/mm3


 


ESR    (0-20)  mm/hr


 


PT    (9.7-12.0)  SECONDS


 


INR    


 


APTT    (22-31)  SECONDS


 


Sodium    (136-145)  mEq/L


 


Potassium    (3.5-5.1)  mEq/L


 


Chloride    ()  mEq/L


 


Carbon Dioxide    (21-32)  mEq/L


 


Anion Gap    (5-15)  


 


BUN    (7-18)  mg/dL


 


Creatinine    (0.55-1.02)  mg/dL


 


Est Cr Clr Drug Dosing    mL/min


 


Estimated GFR (MDRD)    (>60)  mL/min


 


BUN/Creatinine Ratio    (14-18)  


 


Glucose    ()  mg/dL


 


Hemoglobin A1c  5.90   (4.50-6.20)  %


 


Calcium    (8.5-10.1)  mg/dL


 


Magnesium    (1.8-2.4)  mg/dl


 


Total Bilirubin    (0.2-1.0)  mg/dL


 


AST    (15-37)  U/L


 


ALT    (14-59)  U/L


 


Alkaline Phosphatase    ()  U/L


 


CK-MB (CK-2)    (0-3.6)  ng/ml


 


Troponin I    (0.00-0.056)  ng/mL


 


C-Reactive Protein    (<1.0)  mg/dL


 


NT-Pro-B Natriuret Pep    (0-125)  pg/mL


 


Total Protein    (6.4-8.2)  g/dl


 


Albumin    (3.4-5.0)  g/dl


 


Globulin    gm/dL


 


Albumin/Globulin Ratio    (1-2)  


 


Vitamin B12   259  (193-986)  pg/ml


 


TSH 3rd Generation    (0.358-3.74)  uIU/mL











Meds: 


Medications














Discontinued Medications














Generic Name Dose Route Start Last Admin





  Trade Name Gloria  PRN Reason Stop Dose Admin


 


Sodium Chloride  1,000 mls @ 150 mls/hr  08/24/20 13:30  08/24/20 13:50





  Normal Saline  IV   150 mls/hr





  ASDIRECTED [facility]   Administration














- Radiology Interpretation


Free Text/Narrative:: 


65-year-old female presents to the ED with generally just not feeling well for 

the last for 5 days.  Feels short of breath on exertion.  No cough or sputum 

production.  Feels lightheaded and dizzy at times.  Feels generalized weakness. 

 Decreased appetite.  No vomiting or diarrhea.  She has a history of 

Prinzmetal's angina for which he takes long-acting nitroglycerin or Imdur.  She 

took 2 tablets of nitroglycerin earlier this morning because she was not feeling

 well but she did not have any associated chest pain at that time.  Would have 

aggravated feeling dizzy or lightheaded.  Examination is completely normal.  

Plan routine labs will be collected to look for electrolyte imbalance and assess

 cardiac function.  1 view chest x-ray to be done.








- Re-Assessments/Exams


Free Text/Narrative Re-Assessment/Exam: 





08/24/20 14:55 White count is 5.26.  Auto differential shows 65% neutrophils.  

Hemoglobin is 13.4 with hematocrit of 41.9.  MCV is mildly elevated at 99.1.  

Platelet counts 205,000.  PT is 11.0.  INR is 1.03.  PTT is 25.  Sodium 141 with

 a potassium of 3.6 chloride 104 with a bicarb of 28.  Anion gap is 12.6.  BUN 

is 14 with a creatinine of 0.8 and a GFR greater than 60.  Glucose mildly 

elevated at 155.  Calcium is 9.1 magnesium is 1.8.  Liver function normal.  CK-

MB fraction 0.8 troponin I is less than 0.017.  C-reactive protein is 0.4 BNP 

slightly elevated at 172.  TSH is normal at 1.005.  Total protein 7.3 with an 

albumin fraction of 3.9.  Chest x-ray reveals some calcium up in her ribs or 

chondrocalcinosis.  Lungs are otherwise clear.  Cardiac silhouette is normal no 

pleural effusion no pneumothorax.





08/24/20 15:13 patient admits to having a cookie prior to coming to the ED which

 likely postoperative blood sugar mildly.  I had ordered a glycosylated protein 

to make sure she was not slipping into type 2 diabetes.  I have advised her to 

 some vitamin B12 tablets i.e. thousand micrograms tablet per day due to 

elevated MCV of red cells.  I have asked her to consider discontinuing her Imdur

 for 2 weeks to see if she does not feel better i.e. less dizzy weak and 

lightheaded in the mornings.  Usually this medication causes tachyphylaxis 

within 10 to 12 days of use and often is not helping the patient.  She has no 

firm history of Prinzmetal's angina.  There is a large component of anxiety in 

her symptom complex.  Patient recognizes that she tends to be a very anxious 

person.





08/24/20 16:52 hemoglobin A1c is 5.90 confirming the patient is not diabetic.  

Elevated blood sugar was secondary to food eaten just prior to coming to the ED.

  Vitamin B12 level returned at 259 which is low normal.








Departure





- Departure


Time of Disposition: 15:14


Disposition: Home, Self-Care 01


Reason for Transfer *Q: Other


Condition: Fair


Clinical Impression: 


 Non-cardiac chest pain, Episode of generalized weakness, B12 deficiency





Instructions:  Shortness of Breath, Adult, Easy-to-Read, Vitamin B12 Deficiency


Referrals: 


Yudelka Mariscal MD [Primary Care Provider] - 


Forms:  ED Department Discharge


Additional Instructions: 


Evaluation in the emergency room today in regards to generally not feeling well.

 Noted increased shortness of breath on simple walking the last few days 

lightheaded dizziness at times.  General sense of weakness and fatigue.  

Examination did not reveal any positive findings.  ECG and chest x-ray were 

normal lab work suggests possible B12 deficiency with an increased size of your 

red blood cells which we call macrocytosis.  Thus purchasing some vitamin B12 

tablets which are 1 mg or 1000 mcg per dose and take 1 daily.  Lab tests  do not

reveal any sign of any significant heart disease.  Take into consideration of 

discontinuing her Imdur tablet which you take every morning which is a long-

acting nitroglycerin which our body gets used to within 10 to 14 days of use and

often is not helping much of anything.  You may trial off this medication for a 

couple weeks and see if you do not feel better in the mornings i.e. less 

dizziness lightheadedness etc.  However if you start developing increased 

central chest pressure discomfort or pain you should start the Imdur once daily.

 Of course there is some worry or anxiety related to current symptoms.  Make 

sure you get adequate rest and sleep.  Follow up with Dr. Simmons  if any other

problems develop.





Sepsis Event Note (ED)





- Evaluation


Sepsis Screening Result: No Definite Risk





- Focused Exam


Vital Signs: 


                                   Vital Signs











  Temp Pulse Resp BP Pulse Ox


 


 08/24/20 12:47  36.2 C  70  16  143/81 H  95